# Patient Record
Sex: MALE | Race: WHITE
[De-identification: names, ages, dates, MRNs, and addresses within clinical notes are randomized per-mention and may not be internally consistent; named-entity substitution may affect disease eponyms.]

---

## 2018-01-10 ENCOUNTER — HOSPITAL ENCOUNTER (EMERGENCY)
Dept: HOSPITAL 72 - EMR | Age: 48
Discharge: HOME | End: 2018-01-10
Payer: COMMERCIAL

## 2018-01-10 VITALS — DIASTOLIC BLOOD PRESSURE: 87 MMHG | SYSTOLIC BLOOD PRESSURE: 142 MMHG

## 2018-01-10 VITALS — WEIGHT: 250 LBS | BODY MASS INDEX: 31.08 KG/M2 | HEIGHT: 75 IN

## 2018-01-10 DIAGNOSIS — I10: ICD-10-CM

## 2018-01-10 DIAGNOSIS — F12.90: ICD-10-CM

## 2018-01-10 DIAGNOSIS — M10.9: ICD-10-CM

## 2018-01-10 DIAGNOSIS — R55: Primary | ICD-10-CM

## 2018-01-10 LAB
ADD MANUAL DIFF: NO
ALBUMIN SERPL-MCNC: 3.9 G/DL (ref 3.4–5)
ALBUMIN/GLOB SERPL: 1.1 {RATIO} (ref 1–2.7)
ALP SERPL-CCNC: 79 U/L (ref 46–116)
ALT SERPL-CCNC: 101 U/L (ref 12–78)
ANION GAP SERPL CALC-SCNC: 11 MMOL/L (ref 5–15)
AST SERPL-CCNC: 38 U/L (ref 15–37)
BASOPHILS NFR BLD AUTO: 1.5 % (ref 0–2)
BILIRUB SERPL-MCNC: 0.5 MG/DL (ref 0.2–1)
BUN SERPL-MCNC: 13 MG/DL (ref 7–18)
CALCIUM SERPL-MCNC: 9 MG/DL (ref 8.5–10.1)
CHLORIDE SERPL-SCNC: 102 MMOL/L (ref 98–107)
CO2 SERPL-SCNC: 27 MMOL/L (ref 21–32)
CREAT SERPL-MCNC: 1.3 MG/DL (ref 0.55–1.3)
EOSINOPHIL NFR BLD AUTO: 2.3 % (ref 0–3)
ERYTHROCYTE [DISTWIDTH] IN BLOOD BY AUTOMATED COUNT: 11.4 % (ref 11.6–14.8)
GLOBULIN SER-MCNC: 3.6 G/DL
HCT VFR BLD CALC: 43.1 % (ref 42–52)
HGB BLD-MCNC: 13.9 G/DL (ref 14.2–18)
LYMPHOCYTES NFR BLD AUTO: 39.6 % (ref 20–45)
MCV RBC AUTO: 85 FL (ref 80–99)
MONOCYTES NFR BLD AUTO: 8.6 % (ref 1–10)
NEUTROPHILS NFR BLD AUTO: 47.9 % (ref 45–75)
PLATELET # BLD: 248 K/UL (ref 150–450)
POTASSIUM SERPL-SCNC: 3.2 MMOL/L (ref 3.5–5.1)
RBC # BLD AUTO: 5.07 M/UL (ref 4.7–6.1)
SODIUM SERPL-SCNC: 139 MMOL/L (ref 136–145)
WBC # BLD AUTO: 11.4 K/UL (ref 4.8–10.8)

## 2018-01-10 PROCEDURE — 71045 X-RAY EXAM CHEST 1 VIEW: CPT

## 2018-01-10 PROCEDURE — 93005 ELECTROCARDIOGRAM TRACING: CPT

## 2018-01-10 PROCEDURE — 36415 COLL VENOUS BLD VENIPUNCTURE: CPT

## 2018-01-10 PROCEDURE — 85025 COMPLETE CBC W/AUTO DIFF WBC: CPT

## 2018-01-10 PROCEDURE — 96360 HYDRATION IV INFUSION INIT: CPT

## 2018-01-10 PROCEDURE — 84484 ASSAY OF TROPONIN QUANT: CPT

## 2018-01-10 PROCEDURE — 80329 ANALGESICS NON-OPIOID 1 OR 2: CPT

## 2018-01-10 PROCEDURE — 99284 EMERGENCY DEPT VISIT MOD MDM: CPT

## 2018-01-10 PROCEDURE — 80053 COMPREHEN METABOLIC PANEL: CPT

## 2018-01-10 NOTE — EMERGENCY ROOM REPORT
History of Present Illness


General


Chief Complaint:  Syncope


Source:  Patient, EMS





Present Illness


HPI


47-year-old male with pmhx of hypertension p/w syncopal episode.


Syncopal episode occurred while patient was eating with a friend, also states 

that he ate a marijuana edible the first time.


No head trauma, +LOC for 2 min.


Pt states feeling lightheadedness/nauseous before event. 


Denies sob, palpitations, or chest pain before event. 


There was no seizure like activity, tongue biting, urinary incontinence, blurry 

vision, or HA. Pt has been eating/drinking well.


This is the first episode of syncope.


Denies recent fever, chills, n/v/d. 


Patient currently just states that he "does not feel right" throughout his body


Allergies:  


Coded Allergies:  


     No Known Allergies (Unverified , 1/10/18)





Patient History


Past Medical History:  see triage record


Past Surgical History:  none


Pertinent Family History:  none


Reviewed Nursing Documentation:  PMH: Agreed, PSxH: Agreed





Nursing Documentation-PMH


Past Medical History:  No History, Except For


Hx Hypertension:  Yes - gout





Review of Systems


All Other Systems:  negative except mentioned in HPI





Physical Exam





Vital Signs








  Date Time  Temp Pulse Resp B/P (MAP) Pulse Ox O2 Delivery O2 Flow Rate FiO2


 


1/10/18 22:00 97.5 110 18 117/69 98 Room Air  








Sp02 EP Interpretation:  reviewed, normal


General Appearance:  alert, GCS 15, non-toxic, mild distress


Head:  normocephalic, atraumatic


Eyes:  bilateral eye normal inspection, bilateral eye PERRL, bilateral eye EOMI


ENT:  normal ENT inspection, normal pharynx, normal voice, moist mucus membranes


Neck:  normal inspection, full range of motion, supple


Respiratory:  normal inspection, lungs clear, normal breath sounds, no 

respiratory distress, no retraction, no wheezing, speaking full sentences, 

chest symmetrical


Cardiovascular #1:  normal inspection, regular rate, rhythm, no edema, normal 

capillary refill


Cardiovascular #2:  2+ radial (R), 2+ radial (L)


Gastrointestinal:  normal inspection, non tender, soft, non-distended, no 

guarding


Genitourinary:  no CVA tenderness


Musculoskeletal:  normal inspection, back normal, normal range of motion, non-

tender


Neurologic:  normal inspection, alert, oriented x3, responsive, motor strength/

tone normal, sensory intact, normal gait, speech normal


Psychiatric:  normal inspection, judgement/insight normal, memory normal


Skin:  normal inspection, normal color, no rash, warm/dry, well hydrated, 

normal turgor





Medical Decision Making


Diagnostic Impression:  


 Primary Impression:  


 Syncope


ER Course


47-year-old male with syncopal episode, used marijuana





DDX:


Vasovagal vs. orthostatic / hypovolemic/dehydration vs. cardiac arrhythmia (SVT

, Afib) vs. cardiac (AS, ACS) vs. PE vs. metabolic (hypoglycemia, hypoxia), vs 

neuro (seizure, CVA, intracranial bleed)


Drug reaction





Plan:


bgm, cbc, bmp, ekg, cxr


consider IVF


No CT head is necessary at this time, he is ANO x4, no neurological signs or 

symptoms, no signs of head trauma





ER course:


EKG: no acute STT changes, normal intervals, no signs of Brugada / WPW / PE


Patient has remained stable during ED stay.


Patient states improvement of symptoms, and has been able to ambulate without 

difficulty. 


feels better


instructed to stop all marijuana and drug use


ambualting in ED


conversing with friend








Disposition:


Patient is discharged to home and will follow up with their PMD within 3 days. 


Strict return precautions discussed with patient such as severe headache, 

recurrent syncope, chest pain, shortness of breath, nausea or vomiting. Patient 

verbalized understanding.





Please note that this Emergency Department Report was dictated using [employer] technology software, occasionally this can lead to 

erroneous entry secondary to interpretation by the dictation equipment.





EKG Diagnostic Results


EP Interpretation: Yes


Rate: tachy


Rhythm: NSR


ST Segments: No acute changes


ASA given to patient: No





Chest X-ray*


CXR: Ordered: Yes


1 view


Indication: Chest pain


EP interpretation: Yes


Interpretation: No consolidation, no effusion, no PTX, no acute cardiopulmonary 

disease


Impression: No acute disease





Electronically signed by Memo Pressley MD





Laboratory Tests








Test


  1/10/18


22:30


 


White Blood Count


  11.4 K/UL


(4.8-10.8)  H


 


Red Blood Count


  5.07 M/UL


(4.70-6.10)


 


Hemoglobin


  13.9 G/DL


(14.2-18.0)  L


 


Hematocrit


  43.1 %


(42.0-52.0)


 


Mean Corpuscular Volume 85 FL (80-99)  


 


Mean Corpuscular Hemoglobin


  27.4 PG


(27.0-31.0)


 


Mean Corpuscular Hemoglobin


Concent 32.2 G/DL


(32.0-36.0)


 


Red Cell Distribution Width


  11.4 %


(11.6-14.8)  L


 


Platelet Count


  248 K/UL


(150-450)


 


Mean Platelet Volume


  8.1 FL


(6.5-10.1)


 


Neutrophils (%) (Auto)


  47.9 %


(45.0-75.0)


 


Lymphocytes (%) (Auto)


  39.6 %


(20.0-45.0)


 


Monocytes (%) (Auto)


  8.6 %


(1.0-10.0)


 


Eosinophils (%) (Auto)


  2.3 %


(0.0-3.0)


 


Basophils (%) (Auto)


  1.5 %


(0.0-2.0)


 


Sodium Level


  139 MMOL/L


(136-145)


 


Potassium Level


  3.2 MMOL/L


(3.5-5.1)  L


 


Chloride Level


  102 MMOL/L


()


 


Carbon Dioxide Level


  27 MMOL/L


(21-32)


 


Anion Gap


  11 mmol/L


(5-15)


 


Blood Urea Nitrogen


  13 mg/dL


(7-18)


 


Creatinine


  1.3 MG/DL


(0.55-1.30)


 


Estimate Glomerular


Filtration Rate 59.4 mL/min


(>60)


 


Glucose Level


  201 MG/DL


()  H


 


Calcium Level


  9.0 MG/DL


(8.5-10.1)


 


Total Bilirubin


  0.5 MG/DL


(0.2-1.0)


 


Aspartate Amino Transferase


(AST) 38 U/L (15-37)


H


 


Alanine Aminotransferase (ALT)


  101 U/L


(12-78)  H


 


Alkaline Phosphatase


  79 U/L


()


 


Troponin I


  0.000 ng/mL


(0.000-0.056)


 


Total Protein


  7.5 G/DL


(6.4-8.2)


 


Albumin


  3.9 G/DL


(3.4-5.0)


 


Globulin 3.6 g/dL  


 


Albumin/Globulin Ratio 1.1 (1.0-2.7)  


 


Serum Alcohol 4 mg/dL  











Last Vital Signs








  Date Time  Temp Pulse Resp B/P (MAP) Pulse Ox O2 Delivery O2 Flow Rate FiO2


 


1/10/18 22:00 97.5 110 18 117/69 98 Room Air  








Disposition:  HOME, SELF-CARE


Condition:  Improved


Patient Instructions:  Syncope











Retino,Clairose M.D. Markus 10, 2018 22:31

## 2018-01-11 NOTE — DIAGNOSTIC IMAGING REPORT
Indication: Dyspnea

 

Comparison:  None

 

A single view chest radiograph was obtained.

 

Findings:

 

Cardiomediastinal appearance is within normal limits for age.  Pulmonary vascularity

is appropriate. The diaphragmatic contour is smooth and costophrenic angles are

sharp. No pleural effusions are identified. The bones are unremarkable.

 

Impression: No acute findings

## 2018-01-13 NOTE — CARDIOLOGY REPORT
--------------- APPROVED REPORT --------------





EKG Measurement

Heart [id number]

[id number]

[id number]

[id number]

[id number]





Sinus tachycardia

Cannot rule out Inferior infarct, age undetermined

Abnormal ECG